# Patient Record
Sex: FEMALE | Race: BLACK OR AFRICAN AMERICAN | NOT HISPANIC OR LATINO
[De-identification: names, ages, dates, MRNs, and addresses within clinical notes are randomized per-mention and may not be internally consistent; named-entity substitution may affect disease eponyms.]

---

## 2018-01-31 ENCOUNTER — RESULT REVIEW (OUTPATIENT)
Age: 38
End: 2018-01-31

## 2019-02-21 ENCOUNTER — RESULT REVIEW (OUTPATIENT)
Age: 39
End: 2019-02-21

## 2020-03-02 ENCOUNTER — RESULT REVIEW (OUTPATIENT)
Age: 40
End: 2020-03-02

## 2020-05-27 ENCOUNTER — APPOINTMENT (OUTPATIENT)
Dept: UROLOGY | Facility: CLINIC | Age: 40
End: 2020-05-27
Payer: COMMERCIAL

## 2020-05-27 VITALS
HEIGHT: 69 IN | RESPIRATION RATE: 18 BRPM | BODY MASS INDEX: 22.96 KG/M2 | SYSTOLIC BLOOD PRESSURE: 154 MMHG | HEART RATE: 93 BPM | DIASTOLIC BLOOD PRESSURE: 102 MMHG | WEIGHT: 155 LBS

## 2020-05-27 DIAGNOSIS — Z82.49 FAMILY HISTORY OF ISCHEMIC HEART DISEASE AND OTHER DISEASES OF THE CIRCULATORY SYSTEM: ICD-10-CM

## 2020-05-27 DIAGNOSIS — Z80.42 FAMILY HISTORY OF MALIGNANT NEOPLASM OF PROSTATE: ICD-10-CM

## 2020-05-27 DIAGNOSIS — Z78.9 OTHER SPECIFIED HEALTH STATUS: ICD-10-CM

## 2020-05-27 PROBLEM — Z00.00 ENCOUNTER FOR PREVENTIVE HEALTH EXAMINATION: Status: ACTIVE | Noted: 2020-05-27

## 2020-05-27 PROCEDURE — 99204 OFFICE O/P NEW MOD 45 MIN: CPT

## 2020-05-27 NOTE — REVIEW OF SYSTEMS
[Negative] : Endocrine [Told you have blood in urine on a urine test] : told blood was present in a urine test [Wake up at night to urinate  How many times?  ___] : wakes up to urinate [unfilled] times during the night

## 2020-05-27 NOTE — ASSESSMENT
[FreeTextEntry1] : Microscopic hematuria. \par --UA, UCx\par --CTU\par --CYsto\par \par Uncontrolled HTN. Pt with significant HTN today. Rechecked after pt was at rest for 20min and remains elevated at 158/103. Has family hx of HTN. NO headache. No vision changes. Feeling well. DIscussed need for urgent eval. Recommend call PCP to be seen today. If cannot be seen, proceed to urgent care for eval.

## 2020-05-27 NOTE — PHYSICAL EXAM
[General Appearance - Well Nourished] : well nourished [General Appearance - Well Developed] : well developed [General Appearance - In No Acute Distress] : no acute distress [Exaggerated Use Of Accessory Muscles For Inspiration] : no accessory muscle use [Edema] : no peripheral edema [Abdomen Soft] : soft [Abdomen Tenderness] : non-tender [Costovertebral Angle Tenderness] : no ~M costovertebral angle tenderness [Skin Color & Pigmentation] : normal skin color and pigmentation [Normal Station and Gait] : the gait and station were normal for the patient's age [No Focal Deficits] : no focal deficits [Oriented To Time, Place, And Person] : oriented to person, place, and time [Supraclavicular Lymph Nodes Enlarged Bilaterally] : supraclavicular [Cervical Lymph Nodes Enlarged Anterior Bilaterally] : anterior cervical

## 2020-05-27 NOTE — HISTORY OF PRESENT ILLNESS
[FreeTextEntry1] : 39 year old F with cc of microscopic hematuria. Pt was seen by GYN for routine exam and found to have blood in the urine. She has never been told this in the past. She reports possible gross hematuria in the past. No hx of stones. No issues with UTIs. No tobacco hx. No exposure hx. No family hx of RCC or TCC. PGF passed from prostate ca in his 80s. \par \par At baseline, no urinary complaints.

## 2020-05-28 LAB
APPEARANCE: CLEAR
BACTERIA: NEGATIVE
BILIRUBIN URINE: NEGATIVE
BLOOD URINE: ABNORMAL
COLOR: YELLOW
GLUCOSE QUALITATIVE U: NEGATIVE
HYALINE CASTS: 5 /LPF
KETONES URINE: NEGATIVE
LEUKOCYTE ESTERASE URINE: NEGATIVE
MICROSCOPIC-UA: NORMAL
NITRITE URINE: NEGATIVE
PH URINE: 5.5
PROTEIN URINE: ABNORMAL
RED BLOOD CELLS URINE: 75 /HPF
SPECIFIC GRAVITY URINE: 1.03
SQUAMOUS EPITHELIAL CELLS: 4 /HPF
UROBILINOGEN URINE: NORMAL
WHITE BLOOD CELLS URINE: 12 /HPF

## 2020-05-29 LAB — BACTERIA UR CULT: NORMAL

## 2020-06-01 ENCOUNTER — APPOINTMENT (OUTPATIENT)
Dept: CT IMAGING | Facility: CLINIC | Age: 40
End: 2020-06-01
Payer: COMMERCIAL

## 2020-06-01 ENCOUNTER — OUTPATIENT (OUTPATIENT)
Dept: OUTPATIENT SERVICES | Facility: HOSPITAL | Age: 40
LOS: 1 days | End: 2020-06-01
Payer: COMMERCIAL

## 2020-06-01 ENCOUNTER — RESULT REVIEW (OUTPATIENT)
Age: 40
End: 2020-06-01

## 2020-06-01 DIAGNOSIS — R31.29 OTHER MICROSCOPIC HEMATURIA: ICD-10-CM

## 2020-06-01 PROCEDURE — 74178 CT ABD&PLV WO CNTR FLWD CNTR: CPT | Mod: 26

## 2020-06-01 PROCEDURE — 74178 CT ABD&PLV WO CNTR FLWD CNTR: CPT

## 2020-06-10 ENCOUNTER — APPOINTMENT (OUTPATIENT)
Dept: UROLOGY | Facility: CLINIC | Age: 40
End: 2020-06-10
Payer: COMMERCIAL

## 2020-06-10 ENCOUNTER — OUTPATIENT (OUTPATIENT)
Dept: OUTPATIENT SERVICES | Facility: HOSPITAL | Age: 40
LOS: 1 days | End: 2020-06-10
Payer: COMMERCIAL

## 2020-06-10 VITALS — SYSTOLIC BLOOD PRESSURE: 162 MMHG | RESPIRATION RATE: 20 BRPM | HEART RATE: 123 BPM | DIASTOLIC BLOOD PRESSURE: 112 MMHG

## 2020-06-10 VITALS — TEMPERATURE: 97.1 F

## 2020-06-10 DIAGNOSIS — N20.0 CALCULUS OF KIDNEY: ICD-10-CM

## 2020-06-10 DIAGNOSIS — R35.0 FREQUENCY OF MICTURITION: ICD-10-CM

## 2020-06-10 DIAGNOSIS — R31.29 OTHER MICROSCOPIC HEMATURIA: ICD-10-CM

## 2020-06-10 PROCEDURE — 99213 OFFICE O/P EST LOW 20 MIN: CPT | Mod: 25

## 2020-06-10 PROCEDURE — 52000 CYSTOURETHROSCOPY: CPT

## 2020-06-10 NOTE — HISTORY OF PRESENT ILLNESS
[FreeTextEntry1] : 39 year old F with cc of microscopic hematuria. Pt was seen by GYN for routine exam and found to have blood in the urine. She has never been told this in the past. She reports possible gross hematuria in the past. No hx of stones. No issues with UTIs. No tobacco hx. No exposure hx. No family hx of RCC or TCC. PGF passed from prostate ca in his 80s. At baseline, no urinary complaints. \par \par CTU shows question of small stone in LMP. Cysto today negative. No prior hx of stones. No family hx of stones. Drinks 1 tea and otherwise water. No special diet. Not sure she drinks enough water. \par \par Seeing her PCP this weekend for HTN.

## 2020-06-10 NOTE — ASSESSMENT
[FreeTextEntry1] : Microscopic hematruia. Probable small stone. WOrk-up otherwise negative. \par --Increase fluid intake\par --Keep minimal caffeine\par --Renal US and follow-up in 1y

## 2020-06-12 DIAGNOSIS — N20.0 CALCULUS OF KIDNEY: ICD-10-CM

## 2020-06-12 DIAGNOSIS — R31.29 OTHER MICROSCOPIC HEMATURIA: ICD-10-CM

## 2021-03-15 ENCOUNTER — RESULT REVIEW (OUTPATIENT)
Age: 41
End: 2021-03-15

## 2021-06-07 ENCOUNTER — APPOINTMENT (OUTPATIENT)
Dept: UROLOGY | Facility: CLINIC | Age: 41
End: 2021-06-07

## 2022-03-17 ENCOUNTER — RESULT REVIEW (OUTPATIENT)
Age: 42
End: 2022-03-17

## 2022-04-19 ENCOUNTER — APPOINTMENT (OUTPATIENT)
Dept: OTOLARYNGOLOGY | Facility: CLINIC | Age: 42
End: 2022-04-19
Payer: COMMERCIAL

## 2022-04-19 DIAGNOSIS — Z86.39 PERSONAL HISTORY OF OTHER ENDOCRINE, NUTRITIONAL AND METABOLIC DISEASE: ICD-10-CM

## 2022-04-19 DIAGNOSIS — Z83.49 FAMILY HISTORY OF OTHER ENDOCRINE, NUTRITIONAL AND METABOLIC DISEASES: ICD-10-CM

## 2022-04-19 DIAGNOSIS — Z86.79 PERSONAL HISTORY OF OTHER DISEASES OF THE CIRCULATORY SYSTEM: ICD-10-CM

## 2022-04-19 DIAGNOSIS — Z83.3 FAMILY HISTORY OF DIABETES MELLITUS: ICD-10-CM

## 2022-04-19 PROCEDURE — 99203 OFFICE O/P NEW LOW 30 MIN: CPT

## 2022-04-19 NOTE — HISTORY OF PRESENT ILLNESS
[de-identified] : 41 year old female who is being referred by Dr. Salazar for suspicious left thyroid nodule. \par 3/21/2022 FNA showing left thyroid gland 2.6 cm  nodule AUS, Thyroseq NRAS pos\par Denies dysphagia, odynophagia, dyspnea, and voice changes.\par Family history of thyroid disease.\par Pfizer COVID vaccined 03/09/2021 & 04/01/2021\par \par Nodule found on w/u by PCP. \par \par Good health otherwise.  Hx HTN.  \par \par Sono at  shows mult bilat nodules incl on R. A 2.4 cm R nodule was not bx as per report on most recent garrett as was bx benign in December at . .   There are multiple bilat small nodules as per sono report. no swallow or resp complaints. These are March 2022 reports\par \par In Dec pt had FNA of R dom nodule benign and the L was Cat 3 and also molec suspiciosu.

## 2022-04-19 NOTE — CONSULT LETTER
[Dear  ___] : Dear  [unfilled], [Consult Letter:] : I had the pleasure of evaluating your patient, [unfilled]. [Please see my note below.] : Please see my note below. [Consult Closing:] : Thank you very much for allowing me to participate in the care of this patient.  If you have any questions, please do not hesitate to contact me. [Sincerely,] : Sincerely, [FreeTextEntry2] : Mark Salazar MD ( [place], [place]) [FreeTextEntry3] : Kvng Cannon MD, FACS\par \par    [facility]\par Associate Chair\par    Department of Otolaryngology\par \par Professor\par Otolaryngology & Molecular Medicine\par [employer] [facility] [employer]\par

## 2022-05-13 DIAGNOSIS — Z01.818 ENCOUNTER FOR OTHER PREPROCEDURAL EXAMINATION: ICD-10-CM

## 2022-08-29 ENCOUNTER — OUTPATIENT (OUTPATIENT)
Dept: OUTPATIENT SERVICES | Facility: HOSPITAL | Age: 42
LOS: 1 days | End: 2022-08-29

## 2022-08-29 VITALS
HEART RATE: 99 BPM | RESPIRATION RATE: 14 BRPM | WEIGHT: 158.07 LBS | HEIGHT: 68 IN | DIASTOLIC BLOOD PRESSURE: 85 MMHG | SYSTOLIC BLOOD PRESSURE: 133 MMHG | OXYGEN SATURATION: 100 % | TEMPERATURE: 98 F

## 2022-08-29 DIAGNOSIS — E04.2 NONTOXIC MULTINODULAR GOITER: ICD-10-CM

## 2022-08-29 DIAGNOSIS — I10 ESSENTIAL (PRIMARY) HYPERTENSION: ICD-10-CM

## 2022-08-29 LAB
ANION GAP SERPL CALC-SCNC: 11 MMOL/L — SIGNIFICANT CHANGE UP (ref 7–14)
BUN SERPL-MCNC: 12 MG/DL — SIGNIFICANT CHANGE UP (ref 7–23)
CALCIUM SERPL-MCNC: 9.5 MG/DL — SIGNIFICANT CHANGE UP (ref 8.4–10.5)
CHLORIDE SERPL-SCNC: 105 MMOL/L — SIGNIFICANT CHANGE UP (ref 98–107)
CO2 SERPL-SCNC: 26 MMOL/L — SIGNIFICANT CHANGE UP (ref 22–31)
CREAT SERPL-MCNC: 1 MG/DL — SIGNIFICANT CHANGE UP (ref 0.5–1.3)
EGFR: 73 ML/MIN/1.73M2 — SIGNIFICANT CHANGE UP
GLUCOSE SERPL-MCNC: 67 MG/DL — LOW (ref 70–99)
HCG UR QL: NEGATIVE — SIGNIFICANT CHANGE UP
HCT VFR BLD CALC: 40.1 % — SIGNIFICANT CHANGE UP (ref 34.5–45)
HGB BLD-MCNC: 13.1 G/DL — SIGNIFICANT CHANGE UP (ref 11.5–15.5)
MCHC RBC-ENTMCNC: 29 PG — SIGNIFICANT CHANGE UP (ref 27–34)
MCHC RBC-ENTMCNC: 32.7 GM/DL — SIGNIFICANT CHANGE UP (ref 32–36)
MCV RBC AUTO: 88.9 FL — SIGNIFICANT CHANGE UP (ref 80–100)
NRBC # BLD: 0 /100 WBCS — SIGNIFICANT CHANGE UP (ref 0–0)
NRBC # FLD: 0 K/UL — SIGNIFICANT CHANGE UP (ref 0–0)
PLATELET # BLD AUTO: 331 K/UL — SIGNIFICANT CHANGE UP (ref 150–400)
POTASSIUM SERPL-MCNC: 3.5 MMOL/L — SIGNIFICANT CHANGE UP (ref 3.5–5.3)
POTASSIUM SERPL-SCNC: 3.5 MMOL/L — SIGNIFICANT CHANGE UP (ref 3.5–5.3)
RBC # BLD: 4.51 M/UL — SIGNIFICANT CHANGE UP (ref 3.8–5.2)
RBC # FLD: 12.7 % — SIGNIFICANT CHANGE UP (ref 10.3–14.5)
SODIUM SERPL-SCNC: 142 MMOL/L — SIGNIFICANT CHANGE UP (ref 135–145)
WBC # BLD: 5.51 K/UL — SIGNIFICANT CHANGE UP (ref 3.8–10.5)
WBC # FLD AUTO: 5.51 K/UL — SIGNIFICANT CHANGE UP (ref 3.8–10.5)

## 2022-08-29 PROCEDURE — 93010 ELECTROCARDIOGRAM REPORT: CPT

## 2022-08-29 RX ORDER — SODIUM CHLORIDE 9 MG/ML
1000 INJECTION, SOLUTION INTRAVENOUS
Refills: 0 | Status: DISCONTINUED | OUTPATIENT
Start: 2022-09-09 | End: 2022-09-23

## 2022-08-29 RX ORDER — SODIUM CHLORIDE 9 MG/ML
3 INJECTION INTRAMUSCULAR; INTRAVENOUS; SUBCUTANEOUS ONCE
Refills: 0 | Status: DISCONTINUED | OUTPATIENT
Start: 2022-09-09 | End: 2022-09-23

## 2022-08-29 NOTE — H&P PST ADULT - HISTORY OF PRESENT ILLNESS
42 yr old female presents with preop dx nontoxic multinodular goiter scheduled for left thyroid lobectomy, PCP noted nodule, biopsy indeterminate, denies dysphagia, dysphonia or hoarseness

## 2022-08-29 NOTE — H&P PST ADULT - NSICDXFAMILYHX_GEN_ALL_CORE_FT
FAMILY HISTORY:  Mother  Still living? Yes, Estimated age: Age Unknown  Family history of diabetes mellitus (DM), Age at diagnosis: Age Unknown    Sibling  Still living? Yes, Estimated age: Age Unknown  Family history of diabetes mellitus (DM), Age at diagnosis: Age Unknown    Aunt  Still living? No  FH: stroke, Age at diagnosis: Age Unknown

## 2022-08-29 NOTE — H&P PST ADULT - PROBLEM SELECTOR PLAN 1
Assessment and Plan: Patient scheduled for surgery on 9/9/22  Patient provided with verbal and written presurgical instructions; verbalized understanding  with teach back.    Patient provided with famotidine for GI prophylaxis; verbalized understanding.    Patient provided with Chlorhexidine wash, verbal and written instructions reviewed. Patient demonstrated understanding with teach back.   Patient instructed to obtain preop covid pcr, lab directory provided     Cardiac evaluation requested by PST for , patient verbalized understanding, will obtain  EKG comparison , Echo and Stress test requested    Problem: Pre-menopausal   Urine specimen cup provided Assessment and Plan: Patient scheduled for surgery on 9/9/22  Patient provided with verbal and written presurgical instructions; verbalized understanding  with teach back.    Patient provided with famotidine for GI prophylaxis; verbalized understanding.    Patient provided with Chlorhexidine wash, verbal and written instructions reviewed. Patient demonstrated understanding with teach back.   Patient instructed to obtain preop covid pcr, lab directory provided     EKG comparison , Echo and Stress test requested    Problem: Pre-menopausal   Urine specimen cup provided

## 2022-08-29 NOTE — H&P PST ADULT - PROBLEM SELECTOR PLAN 2
Assessment and Plan: Patient instructed to take amlodipine  on day of procedure, verbalized understanding.

## 2022-09-07 LAB — SARS-COV-2 RNA SPEC QL NAA+PROBE: SIGNIFICANT CHANGE UP

## 2022-09-08 ENCOUNTER — TRANSCRIPTION ENCOUNTER (OUTPATIENT)
Age: 42
End: 2022-09-08

## 2022-09-08 NOTE — ASU PATIENT PROFILE, ADULT - FALL HARM RISK - UNIVERSAL INTERVENTIONS
Bed in lowest position, wheels locked, appropriate side rails in place/Call bell, personal items and telephone in reach/Instruct patient to call for assistance before getting out of bed or chair/Non-slip footwear when patient is out of bed/[place] to call system/Physically safe environment - no spills, clutter or unnecessary equipment/Purposeful Proactive Rounding/Room/bathroom lighting operational, light cord in reach

## 2022-09-09 ENCOUNTER — TRANSCRIPTION ENCOUNTER (OUTPATIENT)
Age: 42
End: 2022-09-09

## 2022-09-09 ENCOUNTER — OUTPATIENT (OUTPATIENT)
Dept: OUTPATIENT SERVICES | Facility: HOSPITAL | Age: 42
LOS: 1 days | Discharge: HOME | End: 2022-09-09

## 2022-09-09 ENCOUNTER — RESULT REVIEW (OUTPATIENT)
Age: 42
End: 2022-09-09

## 2022-09-09 ENCOUNTER — APPOINTMENT (OUTPATIENT)
Dept: OTOLARYNGOLOGY | Facility: HOSPITAL | Age: 42
End: 2022-09-09

## 2022-09-09 VITALS
RESPIRATION RATE: 16 BRPM | DIASTOLIC BLOOD PRESSURE: 99 MMHG | OXYGEN SATURATION: 100 % | HEIGHT: 68 IN | WEIGHT: 158.07 LBS | TEMPERATURE: 98 F | SYSTOLIC BLOOD PRESSURE: 135 MMHG | HEART RATE: 96 BPM

## 2022-09-09 VITALS
HEART RATE: 98 BPM | OXYGEN SATURATION: 100 % | SYSTOLIC BLOOD PRESSURE: 121 MMHG | DIASTOLIC BLOOD PRESSURE: 79 MMHG | TEMPERATURE: 97 F | RESPIRATION RATE: 16 BRPM

## 2022-09-09 DIAGNOSIS — E04.2 NONTOXIC MULTINODULAR GOITER: ICD-10-CM

## 2022-09-09 LAB — HCG UR QL: NEGATIVE — SIGNIFICANT CHANGE UP

## 2022-09-09 PROCEDURE — 88307 TISSUE EXAM BY PATHOLOGIST: CPT | Mod: 26

## 2022-09-09 PROCEDURE — 60220 PARTIAL REMOVAL OF THYROID: CPT | Mod: GC,LT

## 2022-09-09 DEVICE — LIGATING CLIPS WECK HORIZON MEDIUM (BLUE) 24: Type: IMPLANTABLE DEVICE | Status: FUNCTIONAL

## 2022-09-09 DEVICE — CARTRIDGE MICROCLIP 30: Type: IMPLANTABLE DEVICE | Status: FUNCTIONAL

## 2022-09-09 DEVICE — LIGATING CLIPS WECK HORIZON SMALL-WIDE (RED) 24: Type: IMPLANTABLE DEVICE | Status: FUNCTIONAL

## 2022-09-09 DEVICE — SURGICEL 2 X 14": Type: IMPLANTABLE DEVICE | Status: FUNCTIONAL

## 2022-09-09 RX ORDER — MULTIVIT-MIN/FERROUS GLUCONATE 9 MG/15 ML
1 LIQUID (ML) ORAL
Qty: 0 | Refills: 0 | DISCHARGE

## 2022-09-09 RX ORDER — ACETAMINOPHEN 500 MG
650 TABLET ORAL EVERY 6 HOURS
Refills: 0 | Status: DISCONTINUED | OUTPATIENT
Start: 2022-09-09 | End: 2022-09-23

## 2022-09-09 RX ORDER — OXYCODONE HYDROCHLORIDE 5 MG/1
5 TABLET ORAL ONCE
Refills: 0 | Status: DISCONTINUED | OUTPATIENT
Start: 2022-09-09 | End: 2022-09-09

## 2022-09-09 RX ORDER — OXYCODONE AND ACETAMINOPHEN 5; 325 MG/1; MG/1
1 TABLET ORAL
Qty: 20 | Refills: 0
Start: 2022-09-09 | End: 2022-09-13

## 2022-09-09 RX ORDER — OXYCODONE HYDROCHLORIDE 5 MG/1
5 TABLET ORAL EVERY 6 HOURS
Refills: 0 | Status: DISCONTINUED | OUTPATIENT
Start: 2022-09-09 | End: 2022-09-09

## 2022-09-09 RX ORDER — AMLODIPINE BESYLATE 2.5 MG/1
1 TABLET ORAL
Qty: 0 | Refills: 0 | DISCHARGE

## 2022-09-09 RX ORDER — ERGOCALCIFEROL 1.25 MG/1
1 CAPSULE ORAL
Qty: 0 | Refills: 0 | DISCHARGE

## 2022-09-09 RX ORDER — HYDROMORPHONE HYDROCHLORIDE 2 MG/ML
0.5 INJECTION INTRAMUSCULAR; INTRAVENOUS; SUBCUTANEOUS EVERY 6 HOURS
Refills: 0 | Status: DISCONTINUED | OUTPATIENT
Start: 2022-09-09 | End: 2022-09-09

## 2022-09-09 RX ORDER — ONDANSETRON 8 MG/1
4 TABLET, FILM COATED ORAL EVERY 4 HOURS
Refills: 0 | Status: DISCONTINUED | OUTPATIENT
Start: 2022-09-09 | End: 2022-09-23

## 2022-09-09 RX ADMIN — OXYCODONE HYDROCHLORIDE 5 MILLIGRAM(S): 5 TABLET ORAL at 14:19

## 2022-09-09 RX ADMIN — OXYCODONE HYDROCHLORIDE 5 MILLIGRAM(S): 5 TABLET ORAL at 13:41

## 2022-09-09 NOTE — ASU DISCHARGE PLAN (ADULT/PEDIATRIC) - ASU DC SPECIAL INSTRUCTIONSFT
Activity: No heavy lifting or strenuous activity. Walk as tolerated. Physical therapy per routine. Wound Care: Keep wound dry 2 days. Then wash your wound Daily with soap and water daily. Pat dry. You may shower. No baths, hot tubs or swimming until approved by your surgeon. Follow up 1 - 2 weeks after discharge. Call office for appointment.  Office: [phone].  Call office for fever >101.5 or redness or drainage from wound. Take percocet as needed for pain.

## 2022-09-09 NOTE — ASU DISCHARGE PLAN (ADULT/PEDIATRIC) - CARE PROVIDER_API CALL
Kvng Cannon)  [facility]; Otolaryngology  [address] [place] [id number]  Phone: [phone]  Fax: [phone]  Follow Up Time:

## 2022-09-09 NOTE — BRIEF OPERATIVE NOTE - NSICDXBRIEFPROCEDURE_GEN_ALL_CORE_FT
PROCEDURES:  Surgical removal of lobe of thyroid with total thyroidectomy if indicated 09-Sep-2022 09:45:00  William Sellers

## 2022-09-09 NOTE — ASU DISCHARGE PLAN (ADULT/PEDIATRIC) - NS MD DC FALL RISK RISK
For information on Fall & Injury Prevention, visit: [email or web address] OR  [email or web address] OR  https://www.cdc.gov/steadi/patient.html

## 2022-09-09 NOTE — ASU DISCHARGE PLAN (ADULT/PEDIATRIC) - NURSING INSTRUCTIONS
You were given intravenous TYLENOL for pain management at _____0800am__________. Please DO NOT take any products containing TYLENOL or ACETAMINOPHEN, such as VICODIN, PERCOCET, EXCEDRIN, and any over-the-counter cold medication for the next 6 hours (until _2pm___). DO NOT TAKE MORE THAN 3000 MG OF TYLENOL in a 24 hour period.

## 2022-09-12 ENCOUNTER — TRANSCRIPTION ENCOUNTER (OUTPATIENT)
Age: 42
End: 2022-09-12

## 2022-09-14 LAB — SURGICAL PATHOLOGY STUDY: SIGNIFICANT CHANGE UP

## 2022-09-15 ENCOUNTER — NON-APPOINTMENT (OUTPATIENT)
Age: 42
End: 2022-09-15

## 2022-09-15 ENCOUNTER — APPOINTMENT (OUTPATIENT)
Dept: OTOLARYNGOLOGY | Facility: CLINIC | Age: 42
End: 2022-09-15

## 2022-09-15 DIAGNOSIS — E04.2 NONTOXIC MULTINODULAR GOITER: ICD-10-CM

## 2022-09-15 PROBLEM — I10 ESSENTIAL (PRIMARY) HYPERTENSION: Chronic | Status: ACTIVE | Noted: 2022-08-29

## 2022-09-15 PROCEDURE — 99024 POSTOP FOLLOW-UP VISIT: CPT

## 2022-09-15 NOTE — HISTORY OF PRESENT ILLNESS
[None] : No associated symptoms are reported. [de-identified] : Ms. Hwang is s/p Left thyroid suspicious nodule and multinodular thyroid gland on 9/9/2022 for left thyroid suspicious nodule and multinodular thyroid gland. Presents today for incision assessment and suture removal.\par Denies fever, pain, dysphagia, dysphonia and or dyspnea.\par \par \par

## 2022-09-15 NOTE — CONSULT LETTER
[Dear  ___] : Dear  [unfilled], [Courtesy Letter:] : I had the pleasure of seeing your patient, [unfilled], in my office today. [Please see my note below.] : Please see my note below. [Sincerely,] : Sincerely, [DrErin  ___] : Dr. LOTT [FreeTextEntry2] : Mark Salazar MD ( [place], [place])      [FreeTextEntry3] : Myrtle Mcmillan NP\par Kvng Cannon MD, FACS\par [facility]\par [address]\[place] [address]\par Tel: [phone]\par \par [facility]\par Associate Chair\par Department of Otolaryngology\par Professor of Otolaryngology & Molecular Medicine\par [facility] [employer]\par \par

## 2022-09-15 NOTE — REASON FOR VISIT
[Post-Operative Visit] : a post-operative visit [FreeTextEntry2] : s/p Left thyroid suspicious nodule and multinodular thyroid gland on 9/9/2022

## 2022-10-15 ENCOUNTER — LABORATORY RESULT (OUTPATIENT)
Age: 42
End: 2022-10-15

## 2022-11-29 ENCOUNTER — APPOINTMENT (OUTPATIENT)
Dept: OTOLARYNGOLOGY | Facility: CLINIC | Age: 42
End: 2022-11-29

## 2022-11-29 VITALS
DIASTOLIC BLOOD PRESSURE: 96 MMHG | HEART RATE: 110 BPM | BODY MASS INDEX: 23.7 KG/M2 | OXYGEN SATURATION: 100 % | HEIGHT: 69 IN | SYSTOLIC BLOOD PRESSURE: 149 MMHG | WEIGHT: 160 LBS

## 2022-11-29 PROCEDURE — 99024 POSTOP FOLLOW-UP VISIT: CPT

## 2022-11-29 NOTE — HISTORY OF PRESENT ILLNESS
[None] : No associated symptoms are reported. [de-identified] : Ms. Goodman is s/p Left thyroid lobectomy for  left thyroid suspicious nodule and multinodular thyroid gland. Final path cw  low risk and encapsulated thyroid CA .\par Presents today for post op follow up.\par 10/16/2022 TSH wnr \par \par \par

## 2022-11-29 NOTE — CONSULT LETTER
[Dear  ___] : Dear  [unfilled], [Courtesy Letter:] : I had the pleasure of seeing your patient, [unfilled], in my office today. [Please see my note below.] : Please see my note below. [Sincerely,] : Sincerely, [FreeTextEntry2] : Mark Salazar MD ( [place], [place])    [FreeTextEntry3] : Kvng Cannon MD, FACS\par \par    [facility] [place]\par Associate Chair\par    Department of Otolaryngology\par \par Professor\par Otolaryngology & Molecular Medicine\par [facility] [employer]\par

## 2023-04-14 NOTE — PHYSICAL EXAM
preop  instructions   [General Appearance - Well Developed] : well developed [General Appearance - Well Nourished] : well nourished [General Appearance - In No Acute Distress] : no acute distress [Abdomen Soft] : soft [Abdomen Tenderness] : non-tender [Costovertebral Angle Tenderness] : no ~M costovertebral angle tenderness [Urethral Meatus] : normal urethra [Urinary Bladder Findings] : the bladder was normal on palpation [Skin Color & Pigmentation] : normal skin color and pigmentation [Vagina] : normal vaginal exam [External Female Genitalia] : normal external genitalia [Exaggerated Use Of Accessory Muscles For Inspiration] : no accessory muscle use [Edema] : no peripheral edema [Oriented To Time, Place, And Person] : oriented to person, place, and time [Normal Station and Gait] : the gait and station were normal for the patient's age [No Focal Deficits] : no focal deficits

## 2023-08-04 ENCOUNTER — APPOINTMENT (OUTPATIENT)
Dept: ORTHOPEDIC SURGERY | Facility: CLINIC | Age: 43
End: 2023-08-04
Payer: COMMERCIAL

## 2023-08-04 VITALS — HEIGHT: 69 IN | WEIGHT: 163 LBS | BODY MASS INDEX: 24.14 KG/M2

## 2023-08-04 DIAGNOSIS — S83.91XA SPRAIN OF UNSPECIFIED SITE OF RIGHT KNEE, INITIAL ENCOUNTER: ICD-10-CM

## 2023-08-04 PROCEDURE — 73562 X-RAY EXAM OF KNEE 3: CPT | Mod: RT

## 2023-08-04 PROCEDURE — 99213 OFFICE O/P EST LOW 20 MIN: CPT

## 2023-08-04 NOTE — HISTORY OF PRESENT ILLNESS
[Gradual] : gradual [7] : 7 [0] : 0 [Dull/Aching] : dull/aching [Localized] : localized [Sharp] : sharp [Stabbing] : stabbing [Occasional] : occasional [Leisure] : leisure [Bending forward] : bending forward [Full time] : Work status: full time [de-identified] : 8/4/23: 41 yo female with right knee pain since Dec 2022. She reports falling directly onto her knee while in [facility]. No formal treatment. She has pain with driving and exercise. No pain at night. No prior injuries.  [] : no [FreeTextEntry1] : R knee [FreeTextEntry5] : 42 year old patient is here for an evaluation for the R knee. Patient states she fell down and hit the side of her knee when she was on vacation in December 2022. States her main complaint is when her pain increases when she is driving or exercising. Pain has gradually increased since the incident. Occasionally gets inflamed. Uses a knee brace for support which has been helping.  [FreeTextEntry7] : down the ankle  [FreeTextEntry8] : Driving  [FreeTextEntry9] : Brace [de-identified] : Certain positions

## 2023-08-04 NOTE — PHYSICAL EXAM
[NL (0)] : extension 0 degrees [5___] : hamstring 5[unfilled]/5 [] : light touch is intact throughout [Right] : right knee [There are no fractures, subluxations or dislocations. No significant abnormalities are seen] : There are no fractures, subluxations or dislocations. No significant abnormalities are seen [FreeTextEntry9] : small lateral subluxation patella [TWNoteComboBox7] : flexion 130 degrees

## 2023-08-04 NOTE — ASSESSMENT
[FreeTextEntry1] : right knee pain since fall around 12/22.  lateral joint line pain with possible lmt.  possible pf lateral facet chondromalacia with slight lateral subluxation patella.

## 2023-08-15 ENCOUNTER — APPOINTMENT (OUTPATIENT)
Dept: MRI IMAGING | Facility: CLINIC | Age: 43
End: 2023-08-15

## 2023-08-21 ENCOUNTER — APPOINTMENT (OUTPATIENT)
Dept: MRI IMAGING | Facility: CLINIC | Age: 43
End: 2023-08-21
Payer: COMMERCIAL

## 2023-08-21 PROCEDURE — 73721 MRI JNT OF LWR EXTRE W/O DYE: CPT | Mod: RT

## 2023-08-25 ENCOUNTER — APPOINTMENT (OUTPATIENT)
Dept: ORTHOPEDIC SURGERY | Facility: CLINIC | Age: 43
End: 2023-08-25
Payer: COMMERCIAL

## 2023-08-25 VITALS — WEIGHT: 163 LBS | HEIGHT: 69 IN | BODY MASS INDEX: 24.14 KG/M2

## 2023-08-25 DIAGNOSIS — S83.289A OTHER TEAR OF LATERAL MENISCUS, CURRENT INJURY, UNSPECIFIED KNEE, INITIAL ENCOUNTER: ICD-10-CM

## 2023-08-25 PROCEDURE — 99214 OFFICE O/P EST MOD 30 MIN: CPT

## 2023-08-25 NOTE — HISTORY OF PRESENT ILLNESS
[0] : 0 [Dull/Aching] : dull/aching [de-identified] : 8/4/23: 43 yo female with right knee pain since Dec 2022. She reports falling directly onto her knee while in [facility]. No formal treatment. She has pain with driving and exercise. No pain at night. No prior injuries.  [Gradual] : gradual [7] : 7 [Localized] : localized [Sharp] : sharp [Stabbing] : stabbing [Occasional] : occasional [Leisure] : leisure [Bending forward] : bending forward [] : no [Full time] : Work status: full time [FreeTextEntry1] : R knee [FreeTextEntry5] : 42 year old patient is here for an evaluation for the R knee. Patient states she fell down and hit the side of her knee when she was on vacation in December 2022. States her main complaint is when her pain increases when she is driving or exercising. Pain has gradually increased since the incident. Occasionally gets inflamed. Uses a knee brace for support which has been helping.  [FreeTextEntry7] : down the ankle  [FreeTextEntry8] : Driving  [FreeTextEntry9] : Brace [de-identified] : Certain positions

## 2023-08-25 NOTE — DISCUSSION/SUMMARY
[de-identified] : will increase activity and see how she does. follow up 6 weeks. will scope if pain returns.

## 2023-08-25 NOTE — PHYSICAL EXAM
[Right] : right knee [NL (0)] : extension 0 degrees [4___] : quadriceps 4[unfilled]/5 [5___] : hamstring 5[unfilled]/5 [] : light touch is intact throughout [FreeTextEntry9] : small lateral subluxation patella [TWNoteComboBox7] : flexion 125 degrees

## 2023-08-25 NOTE — ASSESSMENT
[FreeTextEntry1] : right knee pain since fall around 12/22.  lateral joint line pain.  mri shows lmt with cyst.  minimal pain.

## 2023-10-06 ENCOUNTER — APPOINTMENT (OUTPATIENT)
Dept: ORTHOPEDIC SURGERY | Facility: CLINIC | Age: 43
End: 2023-10-06

## 2023-11-21 ENCOUNTER — APPOINTMENT (OUTPATIENT)
Dept: OTOLARYNGOLOGY | Facility: CLINIC | Age: 43
End: 2023-11-21

## 2024-06-11 ENCOUNTER — APPOINTMENT (OUTPATIENT)
Dept: OTOLARYNGOLOGY | Facility: CLINIC | Age: 44
End: 2024-06-11
Payer: COMMERCIAL

## 2024-06-11 VITALS
WEIGHT: 166 LBS | BODY MASS INDEX: 24.59 KG/M2 | DIASTOLIC BLOOD PRESSURE: 90 MMHG | SYSTOLIC BLOOD PRESSURE: 131 MMHG | HEIGHT: 69 IN

## 2024-06-11 DIAGNOSIS — C73 MALIGNANT NEOPLASM OF THYROID GLAND: ICD-10-CM

## 2024-06-11 PROCEDURE — 99214 OFFICE O/P EST MOD 30 MIN: CPT

## 2024-06-11 RX ORDER — AMLODIPINE BESYLATE 5 MG/1
TABLET ORAL
Refills: 0 | Status: ACTIVE | COMMUNITY

## 2024-06-11 NOTE — HISTORY OF PRESENT ILLNESS
[de-identified] : Ms. Goodman presents for an annual follow uo. She is s/p Left thyroid lobectomy for left thyroid suspicious nodule and multinodular thyroid gland on 9/9/2022.  Final path cw low risk and encapsulated thyroid CA . Following up with Dr Kalyn Galeana of [facility] endocrinology.  Patient reports feeling well since last visit. Denies dysphagia, odynophagia, dyspnea, unintentional weight loss, fevers or chills. pt asked to see me in FU as TgB elevated and higher rel to preop

## 2024-06-11 NOTE — CONSULT LETTER
[Dear  ___] : Dear  [unfilled], [DrErin  ___] : Dr. LOTT [Courtesy Letter:] : I had the pleasure of seeing your patient, [unfilled], in my office today. [Please see my note below.] : Please see my note below. [Sincerely,] : Sincerely, [FreeTextEntry2] : Mark Salazar MD ( [place], [place]) [FreeTextEntry3] : .dff

## 2024-10-02 ENCOUNTER — APPOINTMENT (OUTPATIENT)
Dept: OTOLARYNGOLOGY | Facility: CLINIC | Age: 44
End: 2024-10-02
Payer: COMMERCIAL

## 2024-10-02 VITALS
HEIGHT: 69 IN | WEIGHT: 165 LBS | HEART RATE: 89 BPM | BODY MASS INDEX: 24.44 KG/M2 | OXYGEN SATURATION: 100 % | SYSTOLIC BLOOD PRESSURE: 115 MMHG | DIASTOLIC BLOOD PRESSURE: 82 MMHG

## 2024-10-02 DIAGNOSIS — E04.2 NONTOXIC MULTINODULAR GOITER: ICD-10-CM

## 2024-10-02 DIAGNOSIS — C73 MALIGNANT NEOPLASM OF THYROID GLAND: ICD-10-CM

## 2024-10-02 PROCEDURE — 99214 OFFICE O/P EST MOD 30 MIN: CPT

## 2024-10-02 NOTE — HISTORY OF PRESENT ILLNESS
[de-identified] : 44 year old woman presents for an annual follow up. She is s/p Left thyroid lobectomy for left thyroid suspicious nodule and multinodular thyroid gland on 9/9/2022.  Final path cw low risk and encapsulated thyroid CA . Following up with Dr Kalyn Galeana of [facility] endocrinology  9/7 tsh is 1.37 Patient reports doing well since last clinic visit.  tgb Sl elevate rel to prior at 69 Patient denies throat/neck pain, globus sensation, dysphagia, odynophagia, dyspnea, dysphonia, hemoptysis or otalgia. Denies recent fevers/infections, chills, night sweats, unintentional weight loss.

## 2024-10-02 NOTE — CONSULT LETTER
[Dear  ___] : Dear  [unfilled], [Courtesy Letter:] : I had the pleasure of seeing your patient, [unfilled], in my office today. [Please see my note below.] : Please see my note below. [Sincerely,] : Sincerely, [DrErin  ___] : Dr. LOTT [FreeTextEntry2] : Mark Salazar MD ( [place], [place]) [FreeTextEntry3] : Kvng Cannon MD, FACS     [facility] Associate Chair    Department of Otolaryngology  Professor Otolaryngology & Molecular Medicine [facility]

## (undated) DEVICE — ELCTR BOVIE PENCIL SMOKE EVACUATION

## (undated) DEVICE — SUT SILK 3-0 18" TIES

## (undated) DEVICE — PACK HEAD & NECK

## (undated) DEVICE — SOL IRR POUR H2O 500ML

## (undated) DEVICE — POSITIONER FOAM EGG CRATE ULNAR 2PCS (PINK)

## (undated) DEVICE — PREP BETADINE SPONGE STICKS

## (undated) DEVICE — DRSG STERISTRIPS 0.25 X 3"

## (undated) DEVICE — DRAPE MAGNETIC INSTRUMENT MEDIUM

## (undated) DEVICE — PROBE HUMMINGBIRD MONO TAPERED 90MM

## (undated) DEVICE — DRSG BENZOIN 0.6CC

## (undated) DEVICE — SUT SILK 2-0 18" TIES

## (undated) DEVICE — DRAPE TOWEL BLUE 17" X 24"

## (undated) DEVICE — DRAPE SPLIT SHEET 77" X 120"

## (undated) DEVICE — ELCTR GROUNDING PAD ADULT COVIDIEN

## (undated) DEVICE — SUT VICRYL 4-0 27" RB-1 UNDYED

## (undated) DEVICE — DRAIN RESERVOIR FOR JACKSON PRATT 100CC CARDINAL

## (undated) DEVICE — DRSG CURITY GAUZE SPONGE 4 X 4" 12-PLY

## (undated) DEVICE — GLV 7 PROTEXIS (WHITE)

## (undated) DEVICE — SUT VICRYL 6-0 18" P-3 UNDYED

## (undated) DEVICE — SUT SILK 2-0 18" FS

## (undated) DEVICE — LABELS BLANK W PEN

## (undated) DEVICE — CANISTER DISPOSABLE THIN WALL 3000CC

## (undated) DEVICE — PROTECTOR HEEL / ELBOW FLUFFY

## (undated) DEVICE — DRAIN JACKSON PRATT 7FR ROUND END NO TROCAR

## (undated) DEVICE — SUT ETHILON 6-0 18" PS-3

## (undated) DEVICE — VENODYNE/SCD SLEEVE CALF MEDIUM

## (undated) DEVICE — STAPLER SKIN VISI-STAT 35 WIDE

## (undated) DEVICE — DRAPE FLUID WARMER 44 X 44"

## (undated) DEVICE — SUT SILK 2-0 30" SH

## (undated) DEVICE — DRAPE 3/4 SHEET 52X76"